# Patient Record
Sex: FEMALE | Race: BLACK OR AFRICAN AMERICAN | URBAN - METROPOLITAN AREA
[De-identification: names, ages, dates, MRNs, and addresses within clinical notes are randomized per-mention and may not be internally consistent; named-entity substitution may affect disease eponyms.]

---

## 2017-12-28 ENCOUNTER — HISTORICAL (OUTPATIENT)
Dept: ADMINISTRATIVE | Facility: HOSPITAL | Age: 32
End: 2017-12-28

## 2017-12-30 LAB — FINAL CULTURE: NORMAL

## 2025-03-14 ENCOUNTER — OFFICE VISIT (OUTPATIENT)
Dept: URGENT CARE | Facility: CLINIC | Age: 40
End: 2025-03-14
Payer: MEDICAID

## 2025-03-14 VITALS
SYSTOLIC BLOOD PRESSURE: 138 MMHG | HEIGHT: 63 IN | HEART RATE: 85 BPM | WEIGHT: 183 LBS | DIASTOLIC BLOOD PRESSURE: 91 MMHG | TEMPERATURE: 98 F | BODY MASS INDEX: 32.43 KG/M2 | OXYGEN SATURATION: 100 % | RESPIRATION RATE: 17 BRPM

## 2025-03-14 DIAGNOSIS — M54.9 BACK PAIN, UNSPECIFIED BACK LOCATION, UNSPECIFIED BACK PAIN LATERALITY, UNSPECIFIED CHRONICITY: ICD-10-CM

## 2025-03-14 DIAGNOSIS — N30.00 ACUTE CYSTITIS WITHOUT HEMATURIA: Primary | ICD-10-CM

## 2025-03-14 DIAGNOSIS — M54.50 ACUTE LEFT-SIDED LOW BACK PAIN WITHOUT SCIATICA: ICD-10-CM

## 2025-03-14 DIAGNOSIS — I10 HYPERTENSION, UNSPECIFIED TYPE: ICD-10-CM

## 2025-03-14 LAB
BILIRUB UR QL STRIP: POSITIVE
GLUCOSE UR QL STRIP: NEGATIVE
KETONES UR QL STRIP: NEGATIVE
LEUKOCYTE ESTERASE UR QL STRIP: POSITIVE
PH, POC UA: 7
POC BLOOD, URINE: NEGATIVE
POC NITRATES, URINE: NEGATIVE
PROT UR QL STRIP: POSITIVE
SP GR UR STRIP: 1.01 (ref 1–1.03)
UROBILINOGEN UR STRIP-ACNC: ABNORMAL (ref 0.1–1.1)

## 2025-03-14 PROCEDURE — 87086 URINE CULTURE/COLONY COUNT: CPT | Performed by: FAMILY MEDICINE

## 2025-03-14 RX ORDER — NITROFURANTOIN 25; 75 MG/1; MG/1
100 CAPSULE ORAL 2 TIMES DAILY
Qty: 10 CAPSULE | Refills: 0 | Status: SHIPPED | OUTPATIENT
Start: 2025-03-14 | End: 2025-03-19

## 2025-03-14 RX ORDER — PHENAZOPYRIDINE HYDROCHLORIDE 200 MG/1
200 TABLET, FILM COATED ORAL 3 TIMES DAILY PRN
Qty: 6 TABLET | Refills: 0 | Status: SHIPPED | OUTPATIENT
Start: 2025-03-14 | End: 2025-03-16

## 2025-03-14 RX ORDER — LORAZEPAM 0.5 MG/1
0.5 TABLET ORAL 2 TIMES DAILY PRN
COMMUNITY
Start: 2025-02-25

## 2025-03-14 RX ORDER — SERTRALINE HYDROCHLORIDE 100 MG/1
100 TABLET, FILM COATED ORAL
COMMUNITY

## 2025-03-14 RX ORDER — AMLODIPINE BESYLATE 2.5 MG/1
2.5 TABLET ORAL
COMMUNITY

## 2025-03-14 RX ORDER — TRAZODONE HYDROCHLORIDE 100 MG/1
100 TABLET ORAL NIGHTLY
COMMUNITY
Start: 2025-02-23

## 2025-03-14 RX ORDER — HYDROXYZINE PAMOATE 100 MG/1
100 CAPSULE ORAL NIGHTLY PRN
COMMUNITY
Start: 2024-11-10

## 2025-03-14 RX ORDER — CETIRIZINE HYDROCHLORIDE 10 MG/1
TABLET ORAL
COMMUNITY

## 2025-03-14 RX ORDER — ATOMOXETINE 40 MG/1
40 CAPSULE ORAL EVERY MORNING
COMMUNITY
Start: 2025-02-25

## 2025-03-14 NOTE — PATIENT INSTRUCTIONS
Urine dipstick positive for protein and leukocytes.  No blood, no nitrates.  Considering ongoing and worsening symptoms will start on antibiotics today.  Adequate hydration  Pyridium for symptom relief as needed.  Urine cultures today.  Results will be monitored and reported, medication change made if necessary  ER precautions with any acute change in symptoms like fever, flank pain, nausea and vomiting  For low back pain Tylenol every 6 hours as needed.  Activities as tolerated  Blood pressure elevated in the clinic.  History of hypertension.  Possibly from pain.  Monitor the blood pressure and maintain the log.  With persistent elevated numbers follow up with primary MD  Call or return to clinic for any questions

## 2025-03-14 NOTE — PROGRESS NOTES
"Subjective:      Patient ID: Raimundo Armendariz is a 39 y.o. female.    Vitals:  height is 5' 3" (1.6 m) and weight is 83 kg (183 lb). Her temperature is 97.6 °F (36.4 °C). Her blood pressure is 138/91 (abnormal) and her pulse is 85. Her respiration is 17 and oxygen saturation is 100%.     Chief Complaint: Back Pain     Patient is a 39 y.o. female who presents to urgent care with complaints of back pain, painful urination x 3 days. Alleviating factors include none.     ROS :  Constitutional : _No fever, no fatigue or weakness  Neck : _Negative except HPI  Respiratory :_ No coughing, no shortness of breath  Cardiovascular : _No chest pain, no palpitations  Gastrointestinal : _No nausea, no vomiting  Genitourinary : _No flank pain, no vaginal discharge  Integumentary : _Negative for skin rash     Andreafski:  39-year-old female known for multiple chronic medical conditions follows up with primary MD in [facility].  Takes medications for hypertension.  Blood pressure elevated in the clinic.  Present to clinic with concerns of painful urination, urgency since 3 days.  No blood in the urine.  LMP per nurse's note.  States similar complaints around her cycles in the past.  No vaginal discharge or bleeding.  Patient also complains of low back pain mainly on left side.  No fall or trauma.  Can not recall any activity to start the pain.  Denies tingling numbness or weakness to any part of the body    Objective:     Physical Exam  General : Alert and Oriented, No apparent distress, afebrile,, appears comfortable sitting in the exam chair, clear speech  Neck - supple  Respiratory : Bilateral equal breath sounds, nonlabored respirations, clear to auscultate   Cardiovascular : Rate rhythm regular, normal volume pulse  Gastrointestinal : Soft, mild suprapubic pressure on palpation , BS X 4 quadrants - normal  Genitourinary : No CVA tenderness Bilateral  Musculoskeletal:  Gait appears normal.  Left low back pressure to " palpate, nontender.  Spine no point tenderness  Integumentary : Warm, Dry and no rash   Assessment:     1. Acute cystitis without hematuria    2. Acute left-sided low back pain without sciatica    3. Back pain, unspecified back location, unspecified back pain laterality, unspecified chronicity      Plan:   Urine dipstick positive for protein and leukocytes.  No blood, no nitrates.  Considering ongoing and worsening symptoms will start on antibiotics today.  Adequate hydration  Pyridium for symptom relief as needed.  Urine cultures today.  Results will be monitored and reported, medication change made if necessary  ER precautions with any acute change in symptoms like fever, flank pain, nausea and vomiting  For low back pain Tylenol every 6 hours as needed.  Activities as tolerated  Blood pressure elevated in the clinic.  History of hypertension.  Possibly from pain.  Monitor the blood pressure and maintain the log.  With persistent elevated numbers follow up with primary MD  Call or return to clinic for any questions    Acute cystitis without hematuria  -     Urine Culture High Risk  -     phenazopyridine (PYRIDIUM) 200 MG tablet; Take 1 tablet (200 mg total) by mouth 3 (three) times daily as needed for Pain.  Dispense: 6 tablet; Refill: 0  -     nitrofurantoin, macrocrystal-monohydrate, (MACROBID) 100 MG capsule; Take 1 capsule (100 mg total) by mouth 2 (two) times daily. for 5 days  Dispense: 10 capsule; Refill: 0    Acute left-sided low back pain without sciatica    Back pain, unspecified back location, unspecified back pain laterality, unspecified chronicity  -     POCT Urinalysis, Dipstick, Manual, W/O Scope

## 2025-03-15 ENCOUNTER — RESULTS FOLLOW-UP (OUTPATIENT)
Dept: URGENT CARE | Facility: CLINIC | Age: 40
End: 2025-03-15

## 2025-03-15 LAB — BACTERIA UR CULT: NORMAL

## 2025-03-18 ENCOUNTER — TELEPHONE (OUTPATIENT)
Dept: URGENT CARE | Facility: CLINIC | Age: 40
End: 2025-03-18
Payer: MEDICAID